# Patient Record
Sex: MALE | Race: WHITE | NOT HISPANIC OR LATINO | Employment: FULL TIME | URBAN - METROPOLITAN AREA
[De-identification: names, ages, dates, MRNs, and addresses within clinical notes are randomized per-mention and may not be internally consistent; named-entity substitution may affect disease eponyms.]

---

## 2022-10-11 DIAGNOSIS — M25.519 SHOULDER PAIN, UNSPECIFIED CHRONICITY, UNSPECIFIED LATERALITY: Primary | ICD-10-CM

## 2022-10-14 ENCOUNTER — HOSPITAL ENCOUNTER (OUTPATIENT)
Dept: RADIOLOGY | Facility: HOSPITAL | Age: 52
Discharge: HOME | End: 2022-10-14
Attending: ORTHOPAEDIC SURGERY
Payer: COMMERCIAL

## 2022-10-14 ENCOUNTER — OFFICE VISIT (OUTPATIENT)
Dept: ORTHOPEDICS | Facility: CLINIC | Age: 52
End: 2022-10-14
Payer: COMMERCIAL

## 2022-10-14 VITALS — HEIGHT: 69 IN | RESPIRATION RATE: 18 BRPM | BODY MASS INDEX: 27.4 KG/M2 | WEIGHT: 185 LBS

## 2022-10-14 DIAGNOSIS — M75.81 ROTATOR CUFF TENDINITIS, RIGHT: ICD-10-CM

## 2022-10-14 DIAGNOSIS — S46.211A BICEPS TENDON RUPTURE, RIGHT, INITIAL ENCOUNTER: ICD-10-CM

## 2022-10-14 DIAGNOSIS — M25.511 CHRONIC RIGHT SHOULDER PAIN: ICD-10-CM

## 2022-10-14 DIAGNOSIS — M25.519 SHOULDER PAIN, UNSPECIFIED CHRONICITY, UNSPECIFIED LATERALITY: ICD-10-CM

## 2022-10-14 DIAGNOSIS — S43.431A GLENOID LABRUM TEAR, RIGHT, INITIAL ENCOUNTER: Primary | ICD-10-CM

## 2022-10-14 DIAGNOSIS — G89.29 CHRONIC RIGHT SHOULDER PAIN: ICD-10-CM

## 2022-10-14 PROCEDURE — 73030 XR SHOULDER COMPLETE 2 OR MORE VIEWS RIGHT: ICD-10-PCS | Mod: 26,RT,, | Performed by: RADIOLOGY

## 2022-10-14 PROCEDURE — 99203 OFFICE O/P NEW LOW 30 MIN: CPT | Mod: 25,S$GLB,, | Performed by: ORTHOPAEDIC SURGERY

## 2022-10-14 PROCEDURE — 20610 LARGE JOINT ASPIRATION/INJECTION: R GLENOHUMERAL: ICD-10-PCS | Mod: RT,S$GLB,, | Performed by: ORTHOPAEDIC SURGERY

## 2022-10-14 PROCEDURE — 1159F PR MEDICATION LIST DOCUMENTED IN MEDICAL RECORD: ICD-10-PCS | Mod: CPTII,S$GLB,, | Performed by: ORTHOPAEDIC SURGERY

## 2022-10-14 PROCEDURE — 99999 PR PBB SHADOW E&M-EST. PATIENT-LVL III: ICD-10-PCS | Mod: PBBFAC,,, | Performed by: ORTHOPAEDIC SURGERY

## 2022-10-14 PROCEDURE — 99203 PR OFFICE/OUTPT VISIT, NEW, LEVL III, 30-44 MIN: ICD-10-PCS | Mod: 25,S$GLB,, | Performed by: ORTHOPAEDIC SURGERY

## 2022-10-14 PROCEDURE — 20610 DRAIN/INJ JOINT/BURSA W/O US: CPT | Mod: RT,S$GLB,, | Performed by: ORTHOPAEDIC SURGERY

## 2022-10-14 PROCEDURE — 99999 PR PBB SHADOW E&M-EST. PATIENT-LVL III: CPT | Mod: PBBFAC,,, | Performed by: ORTHOPAEDIC SURGERY

## 2022-10-14 PROCEDURE — 73030 X-RAY EXAM OF SHOULDER: CPT | Mod: TC,PN,RT

## 2022-10-14 PROCEDURE — 1159F MED LIST DOCD IN RCRD: CPT | Mod: CPTII,S$GLB,, | Performed by: ORTHOPAEDIC SURGERY

## 2022-10-14 PROCEDURE — 73030 X-RAY EXAM OF SHOULDER: CPT | Mod: 26,RT,, | Performed by: RADIOLOGY

## 2022-10-14 RX ORDER — ALPRAZOLAM 1 MG/1
TABLET ORAL
COMMUNITY

## 2022-10-14 RX ORDER — TRIAMCINOLONE ACETONIDE 40 MG/ML
40 INJECTION, SUSPENSION INTRA-ARTICULAR; INTRAMUSCULAR
Status: DISCONTINUED | OUTPATIENT
Start: 2022-10-14 | End: 2022-10-14 | Stop reason: HOSPADM

## 2022-10-14 RX ADMIN — TRIAMCINOLONE ACETONIDE 40 MG: 40 INJECTION, SUSPENSION INTRA-ARTICULAR; INTRAMUSCULAR at 10:10

## 2022-10-14 NOTE — PROCEDURES
Large Joint Aspiration/Injection: R glenohumeral    Date/Time: 10/14/2022 10:30 AM  Performed by: Toñito Green DO  Authorized by: Toñito Green DO     Indications:  Diagnostic evaluation and pain  Site marked: the procedure site was marked    Timeout: prior to procedure the correct patient, procedure, and site was verified    Prep: patient was prepped and draped in usual sterile fashion      Details:  Needle Size:  25 G  Ultrasonic Guidance for needle placement?: No    Approach:  Posterior  Location:  Shoulder  Site:  R glenohumeral  Medications:  40 mg triamcinolone acetonide 40 mg/mL  Patient tolerance:  Patient tolerated the procedure well with no immediate complications

## 2022-10-14 NOTE — PROGRESS NOTES
Subjective:      Patient ID: Vlad Dumont is a 52 y.o. male.    Chief Complaint: Pain and Injury of the Right Shoulder    Referring Provider: Aaareferral Self  No address on file    HPI:  Mr. Dumont is a 52-year-old right-hand-dominant male who presented today for evaluation of recurrent right shoulder pain.  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  Subsequent to that he underwent right shoulder surgery in 2018 and then had a revision in August 2022.  He has had persistent pain in his shoulder since his revision.  Forward flexion with internal rotation and external rotation increases his symptoms while nothing improves it.  His symptoms awaken him at night.  He has taken NSAIDs without help.  He did physical therapy without help.  He has not worn a brace.  He has had injections in the past the last 1 prior to his most recent surgery which has not resolved his symptoms.    Past medical history:   Seasonal allergies  GERD   Headaches   Hiatal hernia   PTSD     Past Surgical History:   Procedure Laterality Date    HERNIA REPAIR UMBILICAL  2020    KNEE ARTHROSCOPY RIGHT 4 TIMES LEFT ONCE BILATERAL    * APPENDECTOMY    SHOULDER ARTHROSCOPY BILATERAL BILATERAL    * COLONOSCOPY    VASECTOMY TWICE     *  * BILATERAL CATARACT EXCISION   HYDROCELE EXCISION       Review of patient's allergies indicates:   Allergen Reactions    Tramadol     Penicillins Rash     Other reaction(s): Eruption of skin       Social History     Occupational History    Preventative    Tobacco Use    Smoking status: Every Day     Types: Cigarettes    Smokeless tobacco: Never   Substance and Sexual Activity    Alcohol use: Yes    Drug use: Never    Sexual activity: Yes     Partners: Female      Family History   Problem Relation Age of Onset    No Known Problems Mother     No Known Problems Father     No Known Problems Sister     No Known  Problems Brother        Previous Hospitalizations:  Appendicitis    ROS:   Review of Systems   Constitutional: Negative for chills and fever.   HENT:  Negative for congestion.    Eyes:  Negative for blurred vision and double vision.   Cardiovascular:  Negative for chest pain and cyanosis.   Respiratory:  Negative for cough and shortness of breath.    Endocrine: Negative for cold intolerance and polydipsia.   Hematologic/Lymphatic: Negative for adenopathy.   Skin:  Negative for dry skin, flushing and rash.   Musculoskeletal:  Positive for joint pain. Negative for gout.   Gastrointestinal:  Positive for heartburn. Negative for constipation and diarrhea.   Genitourinary:  Negative for nocturia.   Neurological:  Positive for headaches. Negative for seizures.   Psychiatric/Behavioral:  Positive for depression. Negative for substance abuse. The patient is nervous/anxious.    Allergic/Immunologic: Positive for environmental allergies.         Objective:      Physical Exam:   General: AAOx3.  No acute distress  HEENT: Normocephalic, PEARLA EOMI, Good Dentition  Neck: Supple, No JVD  Chest: Symetric, equal excursion on inspiration  Abdomen: Soft NTND  Vascular:  Pulses intact and equal bilaterally.  Capillary refill less than 3 seconds and equal bilaterally  Neurologic:  Pinprick and soft touch intact and equal bilaterally  Integment:  No ecchymosis, no errythema.  Multiple tattoos  Extremity:  Shoulder:  Forward flexion/abduction right shoulder 0/145 degrees, left shoulder 0/170°.  Internal rotation right shoulder L5, left shoulder T10.  Negative drop-arm bilaterally.  Mildly positive lift-off right shoulder.  External rotation right shoulder 0/10 degrees, left shoulder 0/20 degrees.  Full can positive right shoulder.  Empty can negative both shoulders.  Hooks/Neer positive right shoulder.  Cross-arm negative both shoulders.  Tender in the bicipital groove right shoulder.  Yergason's negative bilaterally.  Positive Seferino  sign right upper extremity.  Apprehension/relocation positive right shoulder.  Radiography:  Personally reviewed x-rays of the right shoulder completed on 10/14/2022 showed postsurgical changes consistent with a distal clavicle resection and a biceps tenodesis at the proximal humerus shaft      Assessment:       Impression:      1. Glenoid labrum tear, right, initial encounter    2. Rotator cuff tendinitis, right    3. Biceps tendon rupture, right, initial encounter    4. Chronic right shoulder pain          Plan:       1.  Discussed physical examination and radiographic findings with the patient. Vlad understands that he has what appears to be a labrum tear of his shoulder with rotator cuff tendinitis and a ruptured biceps tendon.  Treatment alternatives and outcomes were discussed with the patient he understands he could be treated conservatively with observation, activity modification, NSAIDs, bracing, physical therapy, injections, or he could consider surgical intervention such as revision arthroscopy with rotator cuff repair.  Before any surgical intervention can be entertained an MRI of the patient's shoulder is warranted.  2. Refer for an arthrogram MRI of the right shoulder.  3. Final recommendations after MRI is completed.    4. Offered a steroid injection to the right shoulder, he elected to proceed.  5. Shelly shoulder brace, right shoulder a prescription for the patient to obtain a brace was prepared.  6. Recommend take NSAIDs as tolerated allowed by PCM.  7. Recommend he purchase over-the-counter Voltaren gel and applied to his shoulder twice daily and massage in for 2 minutes.  8. Start doing home exercises a pamphlet on home exercises was given to him.  9. Follow up after MRI is completed.

## 2022-10-20 ENCOUNTER — TELEPHONE (OUTPATIENT)
Dept: ORTHOPEDICS | Facility: CLINIC | Age: 52
End: 2022-10-20
Payer: COMMERCIAL

## 2022-10-20 NOTE — TELEPHONE ENCOUNTER
Returned call. I stated per provider's office note to take over the counter medications as directed on the packaging and allow by his primary care provider. Patient stated understanding.    ----- Message from Kam Parks sent at 10/20/2022  9:22 AM CDT -----  Regarding: pt NEEDS PAIN MEDS, CALL pt   Contact: pt   pt NEEDS PAIN MEDS, CALL pt